# Patient Record
Sex: MALE | Race: WHITE | URBAN - NONMETROPOLITAN AREA
[De-identification: names, ages, dates, MRNs, and addresses within clinical notes are randomized per-mention and may not be internally consistent; named-entity substitution may affect disease eponyms.]

---

## 2022-08-05 ENCOUNTER — HOSPITAL ENCOUNTER (EMERGENCY)
Age: 27
Discharge: HOME | End: 2022-08-05
Attending: EMERGENCY MEDICINE

## 2022-08-05 VITALS
RESPIRATION RATE: 16 BRPM | HEART RATE: 83 BPM | SYSTOLIC BLOOD PRESSURE: 125 MMHG | WEIGHT: 236 LBS | OXYGEN SATURATION: 95 % | TEMPERATURE: 98.8 F | DIASTOLIC BLOOD PRESSURE: 70 MMHG

## 2022-08-05 DIAGNOSIS — U07.1 COVID-19: Primary | ICD-10-CM

## 2022-08-05 DIAGNOSIS — M79.10 MYALGIA: ICD-10-CM

## 2022-08-05 DIAGNOSIS — R11.2 NON-INTRACTABLE VOMITING WITH NAUSEA, UNSPECIFIED VOMITING TYPE: ICD-10-CM

## 2022-08-05 LAB
SARS-COV-2, NAAT: DETECTED
SOURCE: ABNORMAL

## 2022-08-05 PROCEDURE — 87635 SARS-COV-2 COVID-19 AMP PRB: CPT

## 2022-08-05 PROCEDURE — 6370000000 HC RX 637 (ALT 250 FOR IP): Performed by: EMERGENCY MEDICINE

## 2022-08-05 PROCEDURE — 99283 EMERGENCY DEPT VISIT LOW MDM: CPT

## 2022-08-05 RX ORDER — IBUPROFEN 400 MG/1
800 TABLET ORAL ONCE
Status: COMPLETED | OUTPATIENT
Start: 2022-08-05 | End: 2022-08-05

## 2022-08-05 RX ORDER — ACETAMINOPHEN 500 MG
1000 TABLET ORAL ONCE
Status: COMPLETED | OUTPATIENT
Start: 2022-08-05 | End: 2022-08-05

## 2022-08-05 RX ORDER — ONDANSETRON 4 MG/1
4 TABLET, ORALLY DISINTEGRATING ORAL 3 TIMES DAILY PRN
Qty: 21 TABLET | Refills: 0 | Status: SHIPPED | OUTPATIENT
Start: 2022-08-05

## 2022-08-05 RX ADMIN — IBUPROFEN 800 MG: 400 TABLET, FILM COATED ORAL at 22:58

## 2022-08-05 RX ADMIN — ACETAMINOPHEN 1000 MG: 500 TABLET ORAL at 22:58

## 2022-08-05 ASSESSMENT — PAIN SCALES - GENERAL: PAINLEVEL_OUTOF10: 7

## 2022-08-05 ASSESSMENT — PAIN - FUNCTIONAL ASSESSMENT: PAIN_FUNCTIONAL_ASSESSMENT: 0-10

## 2022-08-05 ASSESSMENT — PAIN DESCRIPTION - LOCATION: LOCATION: BACK

## 2022-08-06 NOTE — ED PROVIDER NOTES
CHIEF COMPLAINT    Chief Complaint   Patient presents with    Fever    Emesis    Back Pain     HPI  Leny Price is a 24 y.o. male who presents to the ED with complaints of fevers, myalgias, and nausea with vomiting. Patient states he awakened at approximately 3 AM with diffuse body myalgias and nausea with vomiting. His myalgias are rated as moderate to severe in severity and exacerbated with certain movements. Also has a frontal headache during this time. He attempted to use Tylenol and ibuprofen with mild improvement. His last ibuprofen dose was 2 hours ago at which time patient took 200 mg. Patient's headache does not radiate. He states that the symptoms began suddenly overnight. Despite having nausea he has no abdominal pain. His bowel moods have remained normal.  Patient is urinating without difficulty. He is uncertain of any recent sick contacts. He has not received COVID-19 vaccination this year. Patient denies chest pain, shortness of breath abdominal pain, cough, dizziness, lightheadedness. REVIEW OF SYSTEMS  Constitutional: Complains of fevers and chills  Eye: No visual changes  HENT: No earache or sore throat. Resp: No SOB or productive cough. Cardio: No chest pain or palpitations. GI: No abdominal pain, nausea, vomiting, constipation or diarrhea. No melena. : No dysuria, urgency or frequency. Endocrine: No heat intolerance, no cold intolerance, no polydipsia   Lymphatics: No adenopathy  Musculoskeletal: Complains of myalgias  Neuro: Complains of headache  Psych: No homicidal or suicidal thoughts  Skin: No rash, No itching. ?  ? PAST MEDICAL HISTORY  No past medical history on file. FAMILY HISTORY  No family history on file. SOCIAL HISTORY  Social History     Socioeconomic History    Marital status: Single       SURGICAL HISTORY  No past surgical history on file.   CURRENT MEDICATIONS  Discharge Medication List as of 8/5/2022 11:51 PM        ALLERGIES  No Known Allergies    Nursing notes reviewed by myself for past medical history, family history, social history, surgical history, current medications, and allergies. PHYSICAL EXAM  VITAL SIGNS: Triage VS:    ED Triage Vitals [08/05/22 2229]   Enc Vitals Group      /70      Heart Rate 83      Resp 16      Temp (!) 101.8 °F (38.8 °C)      Temp Source Oral      SpO2 97 %      Weight 236 lb (107 kg)      Height       Head Circumference       Peak Flow       Pain Score       Pain Loc       Pain Edu? Excl. in [address]? Constitutional: Well developed, Well nourished, nontoxic appearing  HENT: Normocephalic, Atraumatic, Bilateral external ears normal, Oropharynx moist, mild erythema to posterior oropharynx, no oral exudates, Nose normal.   Eyes: PERRL, EOMI, Conjunctiva normal, No discharge. No scleral icterus. Neck: Normal range of motion, No tenderness, Supple. Lymphatic: No lymphadenopathy noted. Cardiovascular: Normal heart rate, Normal rhythm, No murmurs, gallops or rubs. Thorax & Lungs: Normal breath sounds, No respiratory distress, No wheezing. Abdomen: Soft, No tenderness, No masses, No pulsatile masses, No distention, Normal bowel sounds  Skin: Warm, Dry, Pink, No mottling, No erythema, No rash. Back: No tenderness, No CVA tenderness. Extremities: No edema, No tenderness, No cyanosis, Normal perfusion, No clubbing. Musculoskeletal: Good range of motion in all major joints as observed. No major deformities noted. Neurologic: Alert & oriented x 3, Normal motor function, Normal sensory function, CN II-XII grossly intact as tested, No focal deficits noted. Psychiatric: Affect normal, Judgment normal, Mood normal.     RADIOLOGY  Labs Reviewed   COVID-19, RAPID - Abnormal; Notable for the following components:       Result Value    SARS-CoV-2, NAAT DETECTED (*)     All other components within normal limits     I personally reviewed the images.  The radiologist's interpretation reveals:  Last Imaging results   No orders to display       MEDS GIVEN IN ED:  Medications   ibuprofen (ADVIL;MOTRIN) tablet 800 mg (800 mg Oral Given 8/5/22 2258)   acetaminophen (TYLENOL) tablet 1,000 mg (1,000 mg Oral Given 8/5/22 2258)     COURSE & MEDICAL DECISION MAKING  79-year-old male presents emergency department complaints of fevers, chills, myalgias, headache with nausea and vomiting. Symptoms began last night. He arrives here febrile with temperature of 101.8 °F.  He is without tachycardia and initial oxygen saturation is 95% room air. Patient is nontoxic-appearing on exam.  He has no signs of meningismus. Her lungs are clear to auscultation bilaterally. His abdomen is soft and nontender. At this time we will obtain COVID-19 testing and provide the patient with Motrin and Tylenol. Patient's MCIPY-67 testing is positive. After Tylenol and ibuprofen here his symptoms are improved and repeat temperature is 98.8 °F measured orally. At this time I feel patient is appropriate for discharge home. Provided with a prescription for Zofran. Return precautions provided        Amount and/or Complexity of Data Reviewed  Clinical lab tests: reviewed  Decide to obtain previous medical records or to obtain history from someone other than the patient: yes       -  Patient seen and evaluated in the emergency department. -  Triage and nursing notes reviewed and incorporated. -  Old chart records reviewed and incorporated. -  Work-up included:  See above  -  Results discussed with patient. Appropriate PPE utilized as indicated for entire patient encounter? Time of Disposition: See timeline  ? Discharge Medication List as of 8/5/2022 11:51 PM        START taking these medications    Details   ondansetron (ZOFRAN-ODT) 4 MG disintegrating tablet Take 1 tablet by mouth 3 times daily as needed for Nausea or Vomiting, Disp-21 tablet, R-0Normal           FINAL IMPRESSION  1. COVID-19    2.  Non-intractable vomiting with nausea, unspecified vomiting type    3. Myalgia        Electronically signed by:  [address], DO, 8/6/2022         [address], DO  08/06/22 [id number] Flavio Romo DO  08/06/22 [id number]